# Patient Record
Sex: FEMALE | Race: WHITE | NOT HISPANIC OR LATINO | Employment: OTHER | URBAN - METROPOLITAN AREA
[De-identification: names, ages, dates, MRNs, and addresses within clinical notes are randomized per-mention and may not be internally consistent; named-entity substitution may affect disease eponyms.]

---

## 2023-04-20 ENCOUNTER — LAB (OUTPATIENT)
Dept: LAB | Facility: LAB | Age: 68
End: 2023-04-20
Payer: COMMERCIAL

## 2023-04-20 ENCOUNTER — OFFICE VISIT (OUTPATIENT)
Dept: PRIMARY CARE | Facility: CLINIC | Age: 68
End: 2023-04-20
Payer: COMMERCIAL

## 2023-04-20 VITALS
SYSTOLIC BLOOD PRESSURE: 122 MMHG | DIASTOLIC BLOOD PRESSURE: 78 MMHG | RESPIRATION RATE: 16 BRPM | HEIGHT: 63 IN | HEART RATE: 94 BPM | TEMPERATURE: 98 F | WEIGHT: 214.13 LBS | BODY MASS INDEX: 37.94 KG/M2 | OXYGEN SATURATION: 91 %

## 2023-04-20 DIAGNOSIS — J30.2 SEASONAL ALLERGIC RHINITIS, UNSPECIFIED TRIGGER: Primary | ICD-10-CM

## 2023-04-20 DIAGNOSIS — F41.9 ANXIETY: ICD-10-CM

## 2023-04-20 DIAGNOSIS — R91.1 PULMONARY NODULE: ICD-10-CM

## 2023-04-20 DIAGNOSIS — M25.471 EDEMA OF RIGHT ANKLE: ICD-10-CM

## 2023-04-20 DIAGNOSIS — R60.0 LOWER LEG EDEMA: ICD-10-CM

## 2023-04-20 DIAGNOSIS — G43.119 INTRACTABLE MIGRAINE WITH AURA WITHOUT STATUS MIGRAINOSUS: ICD-10-CM

## 2023-04-20 PROBLEM — G43.909 MIGRAINE: Status: ACTIVE | Noted: 2023-04-20

## 2023-04-20 PROBLEM — J30.9 ALLERGIC RHINITIS: Status: ACTIVE | Noted: 2023-04-20

## 2023-04-20 PROBLEM — J45.909 ASTHMA (HHS-HCC): Status: ACTIVE | Noted: 2023-04-20

## 2023-04-20 PROBLEM — E55.9 VITAMIN D DEFICIENCY: Status: ACTIVE | Noted: 2023-04-20

## 2023-04-20 PROBLEM — I10 HYPERTENSION: Status: ACTIVE | Noted: 2023-04-20

## 2023-04-20 PROBLEM — N28.9 ABNORMAL KIDNEY FUNCTION: Status: ACTIVE | Noted: 2023-04-20

## 2023-04-20 LAB
CREATININE (MG/DL) IN SER/PLAS: 1.08 MG/DL (ref 0.5–1.05)
GFR FEMALE: 56 ML/MIN/1.73M2

## 2023-04-20 PROCEDURE — 99204 OFFICE O/P NEW MOD 45 MIN: CPT

## 2023-04-20 PROCEDURE — 36415 COLL VENOUS BLD VENIPUNCTURE: CPT

## 2023-04-20 PROCEDURE — 1036F TOBACCO NON-USER: CPT

## 2023-04-20 PROCEDURE — 3078F DIAST BP <80 MM HG: CPT

## 2023-04-20 PROCEDURE — 1159F MED LIST DOCD IN RCRD: CPT

## 2023-04-20 PROCEDURE — 3074F SYST BP LT 130 MM HG: CPT

## 2023-04-20 PROCEDURE — 1160F RVW MEDS BY RX/DR IN RCRD: CPT

## 2023-04-20 PROCEDURE — 82565 ASSAY OF CREATININE: CPT

## 2023-04-20 RX ORDER — VERAPAMIL HYDROCHLORIDE 200 MG/1
200 CAPSULE, EXTENDED RELEASE ORAL DAILY
COMMUNITY
End: 2023-11-30 | Stop reason: SDUPTHER

## 2023-04-20 RX ORDER — SUMATRIPTAN 50 MG/1
50 TABLET, FILM COATED ORAL DAILY
COMMUNITY
Start: 2013-04-12 | End: 2023-04-20 | Stop reason: SDUPTHER

## 2023-04-20 RX ORDER — BUSPIRONE HYDROCHLORIDE 5 MG/1
10 TABLET ORAL 2 TIMES DAILY
Qty: 360 TABLET | Refills: 3 | Status: SHIPPED | OUTPATIENT
Start: 2023-04-20 | End: 2023-11-30 | Stop reason: SDUPTHER

## 2023-04-20 RX ORDER — TIZANIDINE HYDROCHLORIDE 4 MG/1
4 CAPSULE, GELATIN COATED ORAL 3 TIMES DAILY
COMMUNITY
Start: 2013-04-12

## 2023-04-20 RX ORDER — FLUTICASONE FUROATE AND VILANTEROL TRIFENATATE 100; 25 UG/1; UG/1
1 POWDER RESPIRATORY (INHALATION)
COMMUNITY
Start: 2017-04-07

## 2023-04-20 RX ORDER — ALBUTEROL SULFATE 90 UG/1
1 AEROSOL, METERED RESPIRATORY (INHALATION) EVERY 4 HOURS PRN
COMMUNITY
Start: 2017-02-06

## 2023-04-20 RX ORDER — TOLTERODINE 4 MG/1
4 CAPSULE, EXTENDED RELEASE ORAL DAILY
COMMUNITY
End: 2023-10-26 | Stop reason: SDUPTHER

## 2023-04-20 RX ORDER — CITALOPRAM 20 MG/1
20 TABLET, FILM COATED ORAL DAILY
COMMUNITY
End: 2023-10-26 | Stop reason: SDUPTHER

## 2023-04-20 RX ORDER — SUMATRIPTAN 50 MG/1
50 TABLET, FILM COATED ORAL CONTINUOUS PRN
Qty: 9 TABLET | Refills: 2 | Status: SHIPPED | OUTPATIENT
Start: 2023-04-20 | End: 2023-11-30 | Stop reason: SDUPTHER

## 2023-04-20 RX ORDER — ALENDRONATE SODIUM 70 MG/1
70 TABLET ORAL
COMMUNITY
End: 2023-10-26 | Stop reason: SDUPTHER

## 2023-04-20 RX ORDER — FLUTICASONE PROPIONATE 50 MCG
1 SPRAY, SUSPENSION (ML) NASAL DAILY
Qty: 15.8 ML | Refills: 3 | Status: SHIPPED | OUTPATIENT
Start: 2023-04-20 | End: 2023-11-30 | Stop reason: SDUPTHER

## 2023-04-20 RX ORDER — AMITRIPTYLINE HYDROCHLORIDE 50 MG/1
50 TABLET, FILM COATED ORAL NIGHTLY
COMMUNITY
End: 2023-10-26 | Stop reason: SDUPTHER

## 2023-04-20 RX ORDER — FLUTICASONE PROPIONATE 50 MCG
1 SPRAY, SUSPENSION (ML) NASAL DAILY
COMMUNITY
Start: 2017-06-30 | End: 2023-04-20 | Stop reason: SDUPTHER

## 2023-04-20 ASSESSMENT — COLUMBIA-SUICIDE SEVERITY RATING SCALE - C-SSRS
1. IN THE PAST MONTH, HAVE YOU WISHED YOU WERE DEAD OR WISHED YOU COULD GO TO SLEEP AND NOT WAKE UP?: NO
6. HAVE YOU EVER DONE ANYTHING, STARTED TO DO ANYTHING, OR PREPARED TO DO ANYTHING TO END YOUR LIFE?: NO
2. HAVE YOU ACTUALLY HAD ANY THOUGHTS OF KILLING YOURSELF?: NO

## 2023-04-20 ASSESSMENT — ENCOUNTER SYMPTOMS
SCALP TENDERNESS: 0
SINUS PRESSURE: 0
NUMBNESS: 0
ABDOMINAL PAIN: 1
HYPERVENTILATION: 0
VOMITING: 0
FATIGUE: 0
ORTHOPNEA: 0
PALPITATIONS: 0
BACK PAIN: 0
ABDOMINAL SWELLING: 0
EYE WATERING: 0
LOSS OF BALANCE: 0
DECREASED CONCENTRATION: 1
BLURRED VISION: 1
PND: 0
EYE REDNESS: 0
WEAKNESS: 0
SYNCOPE: 0
IRRITABILITY: 1
PANIC: 1
VISUAL CHANGE: 1
SWOLLEN GLANDS: 0
MIGRAINE HEADACHES: 0
COMPULSIONS: 0
FACIAL SWEATING: 0
FEVER: 0
TINGLING: 0
DEPRESSED MOOD: 0
COUGH: 0
SEIZURES: 0
ABNORMAL BEHAVIOR: 0
WEIGHT CHANGE: 0
CLUSTER HEADACHES: 0
SORE THROAT: 0
INSOMNIA: 0
HEMOPTYSIS: 0
ANOREXIA: 0
RHINORRHEA: 0
DIZZINESS: 1
NERVOUS/ANXIOUS: 1
WEIGHT LOSS: 0
NAUSEA: 0
PHOTOPHOBIA: 1
MUSCLE TENSION: 0
NECK PAIN: 0
RESTLESSNESS: 1
EYE PAIN: 0
SHORTNESS OF BREATH: 1
MALAISE: 0

## 2023-04-20 ASSESSMENT — PATIENT HEALTH QUESTIONNAIRE - PHQ9
SUM OF ALL RESPONSES TO PHQ9 QUESTIONS 1 AND 2: 0
2. FEELING DOWN, DEPRESSED OR HOPELESS: NOT AT ALL
1. LITTLE INTEREST OR PLEASURE IN DOING THINGS: NOT AT ALL

## 2023-04-20 NOTE — PROGRESS NOTES
Subjective   Patient ID: Amanda Toro is a 67 y.o. female who presents for Establish Care. And to address multiple issues.     Pulmonary nodules: pt was seen in ED for sudden onset of leg edema workup for this included a chest CT which found lung nodules follow-up PET confirmed presence and suggested 3 month follow-up CT, pt here to establish and get this ordered. I reviewed the results of both prior tests to confirm trail of events and need to proceed, order has been placed for new CT      Migraine   This is a chronic problem. The current episode started more than 1 year ago. The problem occurs intermittently. The problem has been waxing and waning. The pain is located in the Retro-orbital region. The pain does not radiate. The pain quality is similar to prior headaches. The quality of the pain is described as aching. The pain is severe. Associated symptoms include abdominal pain, blurred vision, dizziness, photophobia and a visual change. Pertinent negatives include no abnormal behavior, anorexia, back pain, coughing, drainage, ear pain, eye pain, eye redness, eye watering, facial sweating, fever, hearing loss, insomnia, loss of balance, muscle aches, nausea, neck pain, numbness, phonophobia, rhinorrhea, scalp tenderness, seizures, sinus pressure, sore throat, swollen glands, tingling, tinnitus, vomiting, weakness or weight loss. Nothing aggravates the symptoms. She has tried triptans for the symptoms. The treatment provided significant relief. Her past medical history is significant for hypertension, migraines in the family and obesity. There is no history of cancer, cluster headaches, immunosuppression, migraine headaches, pseudotumor cerebri, recent head traumas, sinus disease or TMJ.   Edema    Presents with chronic edema. The current episode started more than 1 month ago (was seen in ED and by prior provider was advised to follow-up with cardiology has not done so). The onset of the episode was sudden.  These episodes happen in the evening. The problem presents itself daily. The problem has been gradually worsening. The edema is present on the both side(s). Risk factors for edema include no known risk factors.   Associated agents include no associated agents.   Associated symptoms include abdominal pain and nocturia.   Pertinent negative symptoms include no abdominal swelling, no chest pain, no cough, no fatigue, no fever, no hemoptysis, no nausea, no orthopnea, no palpitations, no PND, no presyncope, no syncope, no vomiting and no weight change.     Pertinent negative history includes no CAD. Treatments tried include elevating limb(s). There has been mild improvement on treatment(s).   Anxiety  Presents for initial visit. Onset was 1 to 5 years ago. The problem has been waxing and waning. Symptoms include decreased concentration, dizziness, excessive worry, irritability, nervous/anxious behavior, panic, restlessness and shortness of breath. Patient reports no chest pain, compulsions, depressed mood, hyperventilation, insomnia, malaise, muscle tension, nausea, palpitations or suicidal ideas. Symptoms occur most days. The severity of symptoms is moderate. The symptoms are aggravated by family issues. The patient sleeps 3 hours per night. The quality of sleep is poor. Nighttime awakenings: several.     Risk factors include family history. Her past medical history is significant for asthma. There is no history of anemia, anxiety/panic attacks, arrhythmia, bipolar disorder, CAD, CHF, chronic lung disease, depression, fibromyalgia, hyperthyroidism or suicide attempts. Past treatments include benzodiazephines. The treatment provided mild relief. Compliance with prior treatments has been variable.        Review of Systems   Constitutional:  Positive for irritability. Negative for fatigue, fever and weight loss.   HENT:  Negative for ear pain, hearing loss, rhinorrhea, sinus pressure, sore throat and tinnitus.    Eyes:   "Positive for blurred vision and photophobia. Negative for pain and redness.   Respiratory:  Positive for shortness of breath. Negative for cough and hemoptysis.    Cardiovascular:  Negative for chest pain, palpitations, syncope and PND.   Gastrointestinal:  Positive for abdominal pain. Negative for anorexia, nausea and vomiting.   Genitourinary:  Positive for nocturia.   Musculoskeletal:  Negative for back pain and neck pain.   Neurological:  Positive for dizziness. Negative for tingling, seizures, weakness, numbness and loss of balance.   Psychiatric/Behavioral:  Positive for decreased concentration. Negative for suicidal ideas. The patient is nervous/anxious. The patient does not have insomnia.        Objective   /78   Pulse 94   Temp 36.7 °C (98 °F)   Resp 16   Ht 1.6 m (5' 3\")   Wt 97.1 kg (214 lb 2 oz)   SpO2 91%   BMI 37.93 kg/m²     Physical Exam    Assessment/Plan   Problem List Items Addressed This Visit          Musculoskeletal    Lower leg edema    Relevant Orders    Referral to Cardiology    Edema of right ankle    Relevant Orders    Referral to Cardiology       Other    Allergic rhinitis - Primary    Relevant Medications    fluticasone (Flonase) 50 mcg/actuation nasal spray    Migraine    Relevant Medications    SUMAtriptan (Imitrex) 50 mg tablet     Other Visit Diagnoses       Pulmonary nodule        Relevant Orders    CT chest w IV contrast    Creatinine, Serum               "

## 2023-04-20 NOTE — PATIENT INSTRUCTIONS
Assessment/Plan   Problem List Items Addressed This Visit          Musculoskeletal    Lower leg edema    Relevant Orders    Referral to Cardiology    Edema of right ankle    Relevant Orders    Referral to Cardiology       Other    Allergic rhinitis - Primary    Relevant Medications    fluticasone (Flonase) 50 mcg/actuation nasal spray    Migraine    Relevant Medications    SUMAtriptan (Imitrex) 50 mg tablet     Other Visit Diagnoses       Pulmonary nodule        Relevant Orders    CT chest w IV contrast    Creatinine, Serum

## 2023-04-25 ENCOUNTER — APPOINTMENT (OUTPATIENT)
Dept: PRIMARY CARE | Facility: CLINIC | Age: 68
End: 2023-04-25
Payer: COMMERCIAL

## 2023-04-26 ENCOUNTER — TELEPHONE (OUTPATIENT)
Dept: PRIMARY CARE | Facility: CLINIC | Age: 68
End: 2023-04-26
Payer: COMMERCIAL

## 2023-04-26 DIAGNOSIS — R91.1 LUNG NODULE SEEN ON IMAGING STUDY: ICD-10-CM

## 2023-04-26 DIAGNOSIS — R91.8 LUNG NODULE, MULTIPLE: Primary | ICD-10-CM

## 2023-05-04 ENCOUNTER — TELEPHONE (OUTPATIENT)
Dept: PRIMARY CARE | Facility: CLINIC | Age: 68
End: 2023-05-04
Payer: COMMERCIAL

## 2023-05-04 NOTE — TELEPHONE ENCOUNTER
Amanda calling because her CT was cancelled for tomorrow and she was told to call you to see what you wanted to do since insurance didn't approve.

## 2023-05-04 NOTE — TELEPHONE ENCOUNTER
Left patient a [facility], placed order for her to be seen by the pulmonary nodule clinic. As my attempt to get her repeat CT has been denied.

## 2023-05-04 NOTE — TELEPHONE ENCOUNTER
CT was denied by insurance for follow-up of PET that had been follow-up of CT were several noncalcified left lung nodules measuring up to 9mm were found. Pt will now be referred to the lung nodule clinic to attempt to get further workup approved.

## 2023-10-26 ENCOUNTER — PATIENT MESSAGE (OUTPATIENT)
Dept: PRIMARY CARE | Facility: CLINIC | Age: 68
End: 2023-10-26
Payer: COMMERCIAL

## 2023-10-26 DIAGNOSIS — M81.0 AGE RELATED OSTEOPOROSIS, UNSPECIFIED PATHOLOGICAL FRACTURE PRESENCE: ICD-10-CM

## 2023-10-26 DIAGNOSIS — F41.9 ANXIETY: ICD-10-CM

## 2023-10-26 DIAGNOSIS — I10 PRIMARY HYPERTENSION: Primary | ICD-10-CM

## 2023-10-26 DIAGNOSIS — R32 URINARY INCONTINENCE, UNSPECIFIED TYPE: ICD-10-CM

## 2023-10-26 RX ORDER — CITALOPRAM 20 MG/1
20 TABLET, FILM COATED ORAL DAILY
Qty: 90 TABLET | Refills: 3 | Status: SHIPPED | OUTPATIENT
Start: 2023-10-26 | End: 2023-11-30 | Stop reason: SDUPTHER

## 2023-10-26 RX ORDER — ALENDRONATE SODIUM 70 MG/1
70 TABLET ORAL
Qty: 4 TABLET | Refills: 11 | Status: SHIPPED | OUTPATIENT
Start: 2023-10-26 | End: 2023-11-30 | Stop reason: SDUPTHER

## 2023-10-26 RX ORDER — TOLTERODINE 4 MG/1
4 CAPSULE, EXTENDED RELEASE ORAL DAILY
Qty: 90 CAPSULE | Refills: 3 | Status: SHIPPED | OUTPATIENT
Start: 2023-10-26 | End: 2024-10-25

## 2023-10-26 RX ORDER — AMITRIPTYLINE HYDROCHLORIDE 50 MG/1
50 TABLET, FILM COATED ORAL NIGHTLY
Qty: 90 TABLET | Refills: 3 | Status: SHIPPED | OUTPATIENT
Start: 2023-10-26 | End: 2023-11-30 | Stop reason: SDUPTHER

## 2023-11-22 ENCOUNTER — APPOINTMENT (OUTPATIENT)
Dept: PRIMARY CARE | Facility: CLINIC | Age: 68
End: 2023-11-22
Payer: COMMERCIAL

## 2023-11-30 ENCOUNTER — OFFICE VISIT (OUTPATIENT)
Dept: PRIMARY CARE | Facility: CLINIC | Age: 68
End: 2023-11-30
Payer: COMMERCIAL

## 2023-11-30 VITALS
WEIGHT: 220 LBS | BODY MASS INDEX: 38.98 KG/M2 | SYSTOLIC BLOOD PRESSURE: 134 MMHG | HEART RATE: 95 BPM | DIASTOLIC BLOOD PRESSURE: 84 MMHG | OXYGEN SATURATION: 95 % | HEIGHT: 63 IN

## 2023-11-30 DIAGNOSIS — M81.0 AGE RELATED OSTEOPOROSIS, UNSPECIFIED PATHOLOGICAL FRACTURE PRESENCE: ICD-10-CM

## 2023-11-30 DIAGNOSIS — F51.01 PRIMARY INSOMNIA: ICD-10-CM

## 2023-11-30 DIAGNOSIS — G43.119 INTRACTABLE MIGRAINE WITH AURA WITHOUT STATUS MIGRAINOSUS: ICD-10-CM

## 2023-11-30 DIAGNOSIS — F41.9 ANXIETY: ICD-10-CM

## 2023-11-30 DIAGNOSIS — L30.9 DERMATITIS: Primary | ICD-10-CM

## 2023-11-30 DIAGNOSIS — I10 PRIMARY HYPERTENSION: ICD-10-CM

## 2023-11-30 DIAGNOSIS — Z12.31 ENCOUNTER FOR SCREENING MAMMOGRAM FOR MALIGNANT NEOPLASM OF BREAST: ICD-10-CM

## 2023-11-30 DIAGNOSIS — R60.0 LOWER LEG EDEMA: ICD-10-CM

## 2023-11-30 DIAGNOSIS — J30.2 SEASONAL ALLERGIC RHINITIS, UNSPECIFIED TRIGGER: ICD-10-CM

## 2023-11-30 PROCEDURE — 3075F SYST BP GE 130 - 139MM HG: CPT

## 2023-11-30 PROCEDURE — 3079F DIAST BP 80-89 MM HG: CPT

## 2023-11-30 PROCEDURE — 1159F MED LIST DOCD IN RCRD: CPT

## 2023-11-30 PROCEDURE — 1036F TOBACCO NON-USER: CPT

## 2023-11-30 PROCEDURE — 1160F RVW MEDS BY RX/DR IN RCRD: CPT

## 2023-11-30 PROCEDURE — 99214 OFFICE O/P EST MOD 30 MIN: CPT

## 2023-11-30 RX ORDER — BUSPIRONE HYDROCHLORIDE 5 MG/1
10 TABLET ORAL 2 TIMES DAILY
Qty: 360 TABLET | Refills: 3 | Status: SHIPPED | OUTPATIENT
Start: 2023-11-30 | End: 2024-11-29

## 2023-11-30 RX ORDER — TRAZODONE HYDROCHLORIDE 50 MG/1
50 TABLET ORAL NIGHTLY PRN
Qty: 30 TABLET | Refills: 0 | Status: SHIPPED | OUTPATIENT
Start: 2023-11-30 | End: 2024-01-25

## 2023-11-30 RX ORDER — AMITRIPTYLINE HYDROCHLORIDE 50 MG/1
50 TABLET, FILM COATED ORAL NIGHTLY
Qty: 90 TABLET | Refills: 3 | Status: SHIPPED | OUTPATIENT
Start: 2023-11-30 | End: 2024-11-29

## 2023-11-30 RX ORDER — CHLORTHALIDONE 25 MG/1
25 TABLET ORAL DAILY
Qty: 30 TABLET | Refills: 0 | Status: SHIPPED | OUTPATIENT
Start: 2023-11-30 | End: 2024-01-25

## 2023-11-30 RX ORDER — SUMATRIPTAN 50 MG/1
50 TABLET, FILM COATED ORAL CONTINUOUS PRN
Qty: 9 TABLET | Refills: 2 | Status: SHIPPED | OUTPATIENT
Start: 2023-11-30

## 2023-11-30 RX ORDER — VERAPAMIL HYDROCHLORIDE 200 MG/1
200 CAPSULE, EXTENDED RELEASE ORAL DAILY
Qty: 90 CAPSULE | Refills: 3 | Status: SHIPPED | OUTPATIENT
Start: 2023-11-30 | End: 2024-11-29

## 2023-11-30 RX ORDER — FLUTICASONE PROPIONATE 50 MCG
1 SPRAY, SUSPENSION (ML) NASAL DAILY
Qty: 15.8 ML | Refills: 3 | Status: SHIPPED | OUTPATIENT
Start: 2023-11-30

## 2023-11-30 RX ORDER — MUPIROCIN 20 MG/G
OINTMENT TOPICAL
Qty: 30 G | Refills: 0 | Status: SHIPPED | OUTPATIENT
Start: 2023-11-30 | End: 2023-12-10

## 2023-11-30 RX ORDER — ALENDRONATE SODIUM 70 MG/1
70 TABLET ORAL
Qty: 4 TABLET | Refills: 11 | Status: SHIPPED | OUTPATIENT
Start: 2023-11-30 | End: 2024-11-29

## 2023-11-30 RX ORDER — CITALOPRAM 20 MG/1
20 TABLET, FILM COATED ORAL DAILY
Qty: 90 TABLET | Refills: 3 | Status: SHIPPED | OUTPATIENT
Start: 2023-11-30 | End: 2024-11-29

## 2023-11-30 ASSESSMENT — ENCOUNTER SYMPTOMS
VOMITING: 1
HYPERTENSION: 1
INSOMNIA: 1
SHORTNESS OF BREATH: 0
ANOREXIA: 0
BACK PAIN: 0
PHOTOPHOBIA: 0
NECK PAIN: 0
MIGRAINE HEADACHES: 1
RHINORRHEA: 0
TINGLING: 0
HEADACHES: 0
VISUAL CHANGE: 1
DIZZINESS: 1
SORE THROAT: 0
EYE PAIN: 1
WEAKNESS: 1
LOSS OF BALANCE: 1
ABNORMAL BEHAVIOR: 0
SWOLLEN GLANDS: 0
NUMBNESS: 0
EYE WATERING: 1
PALPITATIONS: 0

## 2023-11-30 ASSESSMENT — PATIENT HEALTH QUESTIONNAIRE - PHQ9
SUM OF ALL RESPONSES TO PHQ9 QUESTIONS 1 AND 2: 0
1. LITTLE INTEREST OR PLEASURE IN DOING THINGS: NOT AT ALL
2. FEELING DOWN, DEPRESSED OR HOPELESS: NOT AT ALL

## 2023-11-30 NOTE — PROGRESS NOTES
Subjective   Patient ID: Amanda Toro is a 68 y.o. female who presents for Follow-up (Med refill).    Hypertension  This is a chronic problem. The current episode started more than 1 year ago. The problem has been waxing and waning since onset. The problem is uncontrolled. Associated symptoms include anxiety and peripheral edema. Pertinent negatives include no chest pain, headaches, neck pain, palpitations or shortness of breath. There are no associated agents to hypertension. Risk factors for coronary artery disease include obesity and post-menopausal state. Past treatments include calcium channel blockers. The current treatment provides significant improvement.   Migraine   This is a chronic problem. The current episode started more than 1 year ago. The problem occurs intermittently (2-6 per month). The problem has been unchanged. The pain is located in the Retro-orbital region. The pain does not radiate. The pain quality is similar to prior headaches. Quality: severe vertigo. Associated symptoms include dizziness, eye pain, eye watering, insomnia, a loss of balance, a visual change, vomiting and weakness. Pertinent negatives include no abnormal behavior, anorexia, back pain, neck pain, numbness, phonophobia, photophobia, rhinorrhea, sore throat, swollen glands, tingling or tinnitus. Nothing aggravates the symptoms. She has tried triptans for the symptoms. The treatment provided significant relief. Her past medical history is significant for migraine headaches.   Anxiety  Presents for follow-up visit. Symptoms include dizziness and insomnia. Patient reports no chest pain, palpitations or shortness of breath. Symptoms occur most days. The severity of symptoms is causing significant distress. The patient sleeps 4 hours per night. The quality of sleep is non-restorative. Nighttime awakenings: several.       Insomnia  This is a chronic problem. The current episode started more than 1 year ago. The problem occurs  "daily. The problem has been unchanged. Associated symptoms include a visual change, vomiting and weakness. Pertinent negatives include no anorexia, chest pain, headaches, neck pain, numbness, sore throat or swollen glands. Nothing aggravates the symptoms. Treatments tried: otc sleep aid. The treatment provided no relief.        Review of Systems   HENT:  Negative for rhinorrhea, sore throat and tinnitus.    Eyes:  Positive for pain. Negative for photophobia.   Respiratory:  Negative for shortness of breath.    Cardiovascular:  Negative for chest pain and palpitations.   Gastrointestinal:  Positive for vomiting. Negative for anorexia.   Musculoskeletal:  Negative for back pain and neck pain.   Neurological:  Positive for dizziness, weakness and loss of balance. Negative for tingling, numbness and headaches.   Psychiatric/Behavioral:  The patient has insomnia.        Objective   BP (!) 140/114   Pulse 95   Ht 1.6 m (5' 3\")   Wt 99.8 kg (220 lb)   SpO2 95%   BMI 38.97 kg/m²     Physical Exam  Vitals and nursing note reviewed.   Constitutional:       General: She is not in acute distress.     Appearance: Normal appearance. She is obese. She is not ill-appearing or toxic-appearing.   Eyes:      Extraocular Movements: Extraocular movements intact.      Pupils: Pupils are equal, round, and reactive to light.   Cardiovascular:      Pulses: Normal pulses.      Heart sounds: Normal heart sounds.   Pulmonary:      Effort: Pulmonary effort is normal.      Breath sounds: Normal breath sounds.   Abdominal:      General: Abdomen is flat. Bowel sounds are normal.   Neurological:      Mental Status: She is alert.         Assessment/Plan   Problem List Items Addressed This Visit             ICD-10-CM    Allergic rhinitis J30.9    Relevant Medications    fluticasone (Flonase) 50 mcg/actuation nasal spray    Anxiety F41.9    Relevant Medications    citalopram (CeleXA) 20 mg tablet    busPIRone (Buspar) 5 mg tablet    amitriptyline " (Elavil) 50 mg tablet    Hypertension I10    Relevant Medications    verapamil ER (Veralan PM) 200 mg 24 hr capsule    chlorthalidone (Hygroton) 25 mg tablet    Lower leg edema R60.0    Migraine G43.909    Relevant Medications    SUMAtriptan (Imitrex) 50 mg tablet     Other Visit Diagnoses         Codes    Dermatitis    -  Primary L30.9    Relevant Medications    mupirocin (Bactroban) 2 % ointment    Age related osteoporosis, unspecified pathological fracture presence     M81.0    Relevant Medications    alendronate (Fosamax) 70 mg tablet          Followup for welcome to medicare in January 2024, will do pnm vaccine at this visit

## 2023-12-27 ENCOUNTER — HOSPITAL ENCOUNTER (OUTPATIENT)
Dept: RADIOLOGY | Facility: HOSPITAL | Age: 68
Discharge: HOME | End: 2023-12-27
Payer: COMMERCIAL

## 2023-12-27 DIAGNOSIS — Z12.31 ENCOUNTER FOR SCREENING MAMMOGRAM FOR MALIGNANT NEOPLASM OF BREAST: ICD-10-CM

## 2023-12-27 PROCEDURE — 77067 SCR MAMMO BI INCL CAD: CPT

## 2024-01-16 ENCOUNTER — APPOINTMENT (OUTPATIENT)
Dept: PRIMARY CARE | Facility: CLINIC | Age: 69
End: 2024-01-16
Payer: COMMERCIAL

## 2024-02-27 ENCOUNTER — PATIENT MESSAGE (OUTPATIENT)
Dept: PRIMARY CARE | Facility: CLINIC | Age: 69
End: 2024-02-27
Payer: COMMERCIAL

## 2024-02-27 DIAGNOSIS — I10 PRIMARY HYPERTENSION: ICD-10-CM

## 2024-02-27 DIAGNOSIS — F51.01 PRIMARY INSOMNIA: ICD-10-CM

## 2024-02-28 RX ORDER — TRAZODONE HYDROCHLORIDE 50 MG/1
50 TABLET ORAL NIGHTLY PRN
Qty: 30 TABLET | Refills: 3 | Status: SHIPPED | OUTPATIENT
Start: 2024-02-28

## 2024-02-28 RX ORDER — CHLORTHALIDONE 25 MG/1
25 TABLET ORAL DAILY
Qty: 30 TABLET | Refills: 3 | Status: SHIPPED | OUTPATIENT
Start: 2024-02-28

## 2024-03-13 ENCOUNTER — OFFICE VISIT (OUTPATIENT)
Dept: PRIMARY CARE | Facility: CLINIC | Age: 69
End: 2024-03-13
Payer: COMMERCIAL

## 2024-03-13 ENCOUNTER — LAB (OUTPATIENT)
Dept: LAB | Facility: LAB | Age: 69
End: 2024-03-13
Payer: COMMERCIAL

## 2024-03-13 VITALS
DIASTOLIC BLOOD PRESSURE: 76 MMHG | OXYGEN SATURATION: 94 % | SYSTOLIC BLOOD PRESSURE: 127 MMHG | HEART RATE: 90 BPM | BODY MASS INDEX: 39.16 KG/M2 | HEIGHT: 63 IN | WEIGHT: 221 LBS

## 2024-03-13 DIAGNOSIS — E66.01 OBESITY, MORBID (MULTI): ICD-10-CM

## 2024-03-13 DIAGNOSIS — D69.6 LOW PLATELET COUNT (CMS-HCC): ICD-10-CM

## 2024-03-13 DIAGNOSIS — Z00.00 ROUTINE GENERAL MEDICAL EXAMINATION AT HEALTH CARE FACILITY: ICD-10-CM

## 2024-03-13 DIAGNOSIS — E55.9 VITAMIN D DEFICIENCY: ICD-10-CM

## 2024-03-13 DIAGNOSIS — N18.31 STAGE 3A CHRONIC KIDNEY DISEASE (MULTI): Primary | ICD-10-CM

## 2024-03-13 DIAGNOSIS — R91.8 LUNG NODULE, MULTIPLE: ICD-10-CM

## 2024-03-13 DIAGNOSIS — Z13.6 SCREENING FOR CARDIOVASCULAR CONDITION: ICD-10-CM

## 2024-03-13 DIAGNOSIS — Z23 NEED FOR VACCINATION: ICD-10-CM

## 2024-03-13 DIAGNOSIS — Z13.6 SCREENING FOR HEART DISEASE: ICD-10-CM

## 2024-03-13 DIAGNOSIS — Z00.00 ROUTINE GENERAL MEDICAL EXAMINATION AT HEALTH CARE FACILITY: Primary | ICD-10-CM

## 2024-03-13 LAB
ALBUMIN SERPL BCP-MCNC: 3.9 G/DL (ref 3.4–5)
ALP SERPL-CCNC: 65 U/L (ref 33–136)
ALT SERPL W P-5'-P-CCNC: 22 U/L (ref 7–45)
ANION GAP SERPL CALC-SCNC: 19 MMOL/L (ref 10–20)
AST SERPL W P-5'-P-CCNC: 32 U/L (ref 9–39)
BILIRUB SERPL-MCNC: 0.4 MG/DL (ref 0–1.2)
BUN SERPL-MCNC: 21 MG/DL (ref 6–23)
CALCIUM SERPL-MCNC: 9.1 MG/DL (ref 8.6–10.3)
CHLORIDE SERPL-SCNC: 99 MMOL/L (ref 98–107)
CHOLEST SERPL-MCNC: 222 MG/DL (ref 0–199)
CHOLESTEROL/HDL RATIO: 2.1
CO2 SERPL-SCNC: 23 MMOL/L (ref 21–32)
CREAT SERPL-MCNC: 1.28 MG/DL (ref 0.5–1.05)
EGFRCR SERPLBLD CKD-EPI 2021: 45 ML/MIN/1.73M*2
ERYTHROCYTE [DISTWIDTH] IN BLOOD BY AUTOMATED COUNT: 12.3 % (ref 11.5–14.5)
GLUCOSE SERPL-MCNC: 93 MG/DL (ref 74–99)
HCT VFR BLD AUTO: 43.3 % (ref 36–46)
HDLC SERPL-MCNC: 105.3 MG/DL
HGB BLD-MCNC: 13.7 G/DL (ref 12–16)
LDLC SERPL CALC-MCNC: 100 MG/DL
MCH RBC QN AUTO: 30.2 PG (ref 26–34)
MCHC RBC AUTO-ENTMCNC: 31.6 G/DL (ref 32–36)
MCV RBC AUTO: 96 FL (ref 80–100)
NON HDL CHOLESTEROL: 117 MG/DL (ref 0–149)
NRBC BLD-RTO: 0 /100 WBCS (ref 0–0)
PLATELET # BLD AUTO: 187 X10*3/UL (ref 150–450)
POTASSIUM SERPL-SCNC: 3.6 MMOL/L (ref 3.5–5.3)
PROT SERPL-MCNC: 7 G/DL (ref 6.4–8.2)
RBC # BLD AUTO: 4.53 X10*6/UL (ref 4–5.2)
SODIUM SERPL-SCNC: 137 MMOL/L (ref 136–145)
TRIGL SERPL-MCNC: 84 MG/DL (ref 0–149)
TSH SERPL-ACNC: 2.11 MIU/L (ref 0.44–3.98)
VLDL: 17 MG/DL (ref 0–40)
WBC # BLD AUTO: 7.1 X10*3/UL (ref 4.4–11.3)

## 2024-03-13 PROCEDURE — 36415 COLL VENOUS BLD VENIPUNCTURE: CPT

## 2024-03-13 PROCEDURE — 84443 ASSAY THYROID STIM HORMONE: CPT

## 2024-03-13 PROCEDURE — 85027 COMPLETE CBC AUTOMATED: CPT

## 2024-03-13 PROCEDURE — 86803 HEPATITIS C AB TEST: CPT

## 2024-03-13 PROCEDURE — 3074F SYST BP LT 130 MM HG: CPT

## 2024-03-13 PROCEDURE — 82306 VITAMIN D 25 HYDROXY: CPT

## 2024-03-13 PROCEDURE — 99213 OFFICE O/P EST LOW 20 MIN: CPT

## 2024-03-13 PROCEDURE — 80053 COMPREHEN METABOLIC PANEL: CPT

## 2024-03-13 PROCEDURE — 80061 LIPID PANEL: CPT

## 2024-03-13 PROCEDURE — 90677 PCV20 VACCINE IM: CPT

## 2024-03-13 PROCEDURE — 1159F MED LIST DOCD IN RCRD: CPT

## 2024-03-13 PROCEDURE — 3078F DIAST BP <80 MM HG: CPT

## 2024-03-13 PROCEDURE — G0009 ADMIN PNEUMOCOCCAL VACCINE: HCPCS

## 2024-03-13 PROCEDURE — 1158F ADVNC CARE PLAN TLK DOCD: CPT

## 2024-03-13 PROCEDURE — 1036F TOBACCO NON-USER: CPT

## 2024-03-13 PROCEDURE — 1123F ACP DISCUSS/DSCN MKR DOCD: CPT

## 2024-03-13 PROCEDURE — G0402 INITIAL PREVENTIVE EXAM: HCPCS

## 2024-03-13 PROCEDURE — 1170F FXNL STATUS ASSESSED: CPT

## 2024-03-13 ASSESSMENT — VISUAL ACUITY
OD_CC: 20/30 -1
OS_CC: 20/30 -1

## 2024-03-13 ASSESSMENT — ENCOUNTER SYMPTOMS
FEVER: 0
RHINORRHEA: 0
HEMATURIA: 0
FACIAL ASYMMETRY: 0
SHORTNESS OF BREATH: 0
CONSTIPATION: 0
COUGH: 0
LIGHT-HEADEDNESS: 0
SINUS PRESSURE: 0
FATIGUE: 0
SORE THROAT: 0
WOUND: 0
BACK PAIN: 0
DYSURIA: 0
ABDOMINAL PAIN: 0
SEIZURES: 0
WHEEZING: 0
SINUS PAIN: 0
ARTHRALGIAS: 0
PSYCHIATRIC NEGATIVE: 1
NAUSEA: 0
UNEXPECTED WEIGHT CHANGE: 0
JOINT SWELLING: 0
PALPITATIONS: 0
TROUBLE SWALLOWING: 0
WEAKNESS: 0

## 2024-03-13 ASSESSMENT — ACTIVITIES OF DAILY LIVING (ADL)
USING TRANSPORTATION: INDEPENDENT
PILL BOX USED: YES
TAKING_MEDICATION: INDEPENDENT
DOING_HOUSEWORK: INDEPENDENT
JUDGMENT_ADEQUATE_SAFELY_COMPLETE_DAILY_ACTIVITIES: YES
TOILETING: INDEPENDENT
FEEDING: INDEPENDENT
STIL DRIVING: YES
MANAGING_FINANCES: INDEPENDENT
DRESSING: INDEPENDENT
TAKING MEDICATION: INDEPENDENT
MANAGING FINANCES: INDEPENDENT
PREPARING MEALS: INDEPENDENT
EATING: INDEPENDENT
NEEDS ASSISTANCE WITH FOOD: INDEPENDENT
BATHING: INDEPENDENT
GROCERY_SHOPPING: INDEPENDENT
DOING HOUSEWORK: INDEPENDENT
USING TELEPHONE: INDEPENDENT
ADEQUATE_TO_COMPLETE_ADL: YES
DRESSING: INDEPENDENT
GROCERY SHOPPING: INDEPENDENT
BATHING: INDEPENDENT

## 2024-03-13 ASSESSMENT — PATIENT HEALTH QUESTIONNAIRE - PHQ9
SUM OF ALL RESPONSES TO PHQ9 QUESTIONS 1 AND 2: 0
SUM OF ALL RESPONSES TO PHQ9 QUESTIONS 1 AND 2: 0
1. LITTLE INTEREST OR PLEASURE IN DOING THINGS: NOT AT ALL
2. FEELING DOWN, DEPRESSED OR HOPELESS: NOT AT ALL
1. LITTLE INTEREST OR PLEASURE IN DOING THINGS: NOT AT ALL
2. FEELING DOWN, DEPRESSED OR HOPELESS: NOT AT ALL

## 2024-03-13 NOTE — PROGRESS NOTES
Subjective   Reason for Visit: Amanda Toro is an 68 y.o. female here for a Medicare Wellness visit.          Reviewed all medications by prescribing practitioner or clinical pharmacist (such as prescriptions, OTCs, herbal therapies and supplements) and documented in the medical record.    Pt is here for annual wellness.  Reports overall health is good, reviewed depression screening    Do you take any herbs or supplements that were not prescribed by a doctor? Yes mag for migarne, vit c, vit d3  Are you taking calcium supplements? yes  Are you taking aspirin daily? 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 yes    1. Patient Counseling:  --Nutrition: Stressed importance of moderation in sodium/caffeine intake, saturated fat and cholesterol, caloric balance, sufficient intake of fresh fruits, vegetables, fiber, calcium, iron, and 1 mg of folate supplement per day (for females capable of pregnancy).  --Discussed the issue of estrogen replacement, calcium supplement, and the daily use of baby aspirin.  --Exercise: Stressed the importance of regular exercise.   --Substance Abuse: Discussed cessation/primary prevention of tobacco, alcohol, or other drug use; driving or other dangerous activities under the influence; availability of treatment for abuse.    --Sexuality: Discussed sexually transmitted diseases, partner selection, use of condoms, avoidance of unintended pregnancy  and contraceptive alternatives.   --Injury prevention: Discussed safety belts, safety helmets, smoke detector, smoking near bedding or upholstery.   --Dental health: Discussed importance of regular tooth brushing, flossing, and dental visits.  --Immunizations reviewed.  --Discussed  "benefits of screening colonoscopy.  --After hours service discussed with patient  2 Discussed the patient's BMI with her.  The BMI is above average. The patient received Provided instructions on dietary changes In a face to face session, I informed patient of his/her BMI > 30. We discussed appropriate nutrition choices and exercise plan to help achieve weight reduction.   Provided instructions on exercise because they have an above normal BMI.  3 Follow up as needed for acute illness          Patient Care Team:  MARI Daltno as PCP - General (Family Medicine)  MARI Patel as PCP - [facility] PCP     Review of Systems   Constitutional:  Negative for fatigue, fever and unexpected weight change.   HENT:  Negative for congestion, ear discharge, ear pain, nosebleeds, postnasal drip, rhinorrhea, sinus pressure, sinus pain, sneezing, sore throat and trouble swallowing.    Respiratory:  Negative for cough, shortness of breath and wheezing.    Cardiovascular:  Negative for chest pain, palpitations and leg swelling.   Gastrointestinal:  Negative for abdominal pain, constipation and nausea.   Genitourinary:  Negative for dysuria, hematuria, menstrual problem, pelvic pain, vaginal bleeding and vaginal pain.   Musculoskeletal:  Negative for arthralgias, back pain, gait problem and joint swelling.   Skin:  Negative for rash and wound.   Neurological:  Negative for seizures, facial asymmetry, weakness and light-headedness.   Psychiatric/Behavioral: Negative.         Objective   Vitals:  /76   Pulse 90   Ht 1.6 m (5' 3\")   Wt 100 kg (221 lb)   SpO2 94%   BMI 39.15 kg/m²       Physical Exam  Constitutional:       Appearance: Normal appearance.   HENT:      Head: Normocephalic and atraumatic.      Right Ear: Tympanic membrane and external ear normal.      Left Ear: Tympanic membrane and external ear normal.      Nose: Nose normal.      Mouth/Throat:      Mouth: Mucous membranes are moist.      " Pharynx: Oropharynx is clear. Uvula midline.   Eyes:      General: Lids are normal.      Extraocular Movements: Extraocular movements intact.      Pupils: Pupils are equal, round, and reactive to light.   Neck:      Thyroid: No thyromegaly or thyroid tenderness.   Cardiovascular:      Rate and Rhythm: Normal rate and regular rhythm.      Heart sounds: Normal heart sounds.   Pulmonary:      Effort: Pulmonary effort is normal.      Breath sounds: Normal breath sounds.   Abdominal:      General: Bowel sounds are normal.      Palpations: Abdomen is soft.      Tenderness: There is no abdominal tenderness. There is no guarding.   Musculoskeletal:         General: No swelling. Normal range of motion.      Cervical back: Normal range of motion.      Right lower leg: No edema.      Left lower leg: No edema.   Lymphadenopathy:      Head:      Right side of head: No submental, submandibular or tonsillar adenopathy.      Left side of head: No submental, submandibular or tonsillar adenopathy.      Cervical: No cervical adenopathy.   Skin:     General: Skin is warm and dry.      Capillary Refill: Capillary refill takes less than 2 seconds.      Coloration: Skin is not jaundiced.      Findings: No lesion or rash.   Neurological:      General: No focal deficit present.      Mental Status: She is alert and oriented to person, place, and time.   Psychiatric:         Mood and Affect: Mood normal.         Behavior: Behavior normal.         Thought Content: Thought content normal.         Judgment: Judgment normal.         Assessment/Plan   Problem List Items Addressed This Visit    None    Amanda was seen today for welcome to medicare.  Diagnoses and all orders for this visit:  Routine general medical examination at health care facility  -     Comprehensive Metabolic Panel; Future  -     Hepatitis C Antibody; Future  -     TSH with reflex to Free T4 if abnormal; Future  Screening for cardiovascular condition  -     Cancel: ECG 12  lead  Need for vaccination  -     Cancel: Pneumococcal polysaccharide vaccine 23-valent greater than or equal to 2 years old subcutaneous/IM  -     Pneumococcal conjugate vaccine, 20-valent (PREVNAR 20)  Lung nodule, multiple  Comments:  ct scan last may will get repeat done this may  Orders:  -     CT chest wo IV contrast; Future  Vitamin D deficiency  -     Vitamin D 25-Hydroxy,Total (for eval of Vitamin D levels); Future  Low platelet count (CMS/HCC)  Comments:  was stable on last cbc will repeat today  Orders:  -     CBC; Future  Obesity, morbid (CMS/HCC)  Comments:  discussed need to increase activity and watch calories in  Screening for heart disease  -     Lipid Panel; Future

## 2024-03-13 NOTE — PATIENT INSTRUCTIONS
#BMI 39    Aim for 60 gm of Protein daily  Drink 60 oz of Water daily  Exercise 60 min at least 5days a week  There are many options for weight loss:  *not all programs work for all people, the best way to success is to be 100% ready for change and commitment to 1-2 programs at a time.    Look again into [employer] (the promo code on the website is NOOM20) for cognitive behavior therapy.  [employer] is an option for a meal replacement program.   TripsByTips is a calorie counting kely that is Free and is successful if used properly.   There are several medications also being used (which all also require dietary changes to work):  phentermine, contrave, Wegovy, Saxenda, etc.   Please check with your insurance provider for coverage of such medications.    - eat off the smaller plate (like the salad plate)  - half the plate should be vegetables, 1/4 protein, 1/4 carbs - for lunch and dinner  -  discussed dietary changes including proper protein intake, increase vegetable intake, fruit intake, low carbohydrate intake, and no processed food intake.  - discussed with patient to stop eating fast food  -  put your fork down between bites  - drink at least 64 oz of water day.  do not consume large amounts of water with your meal  - do not drink sugary drinks such as pop or specialty coffee drinks  -  eat your vegetables and protein first.  Have vegetables at lunch and dinner  - discussed with patient to increase activity 4 days a week preferably 5. Exercise should be done 5 days a week including walking for 20-30 minutes each day.   -  keep log of calorie intake and food intake and bring with you to next appointment.You can use an kely on your phone such as [employer]-Data pal.  - discussed with patient using activity trackers such as a fit bit. log  activity daily and bring  activity log at next visit  -  follow-up in one month  - increase your protein intake - should have at least 4 servings of protein per day  - decrease carb intake -  discussed carb serving size - limit carb servings to 4 servings a day

## 2024-03-14 LAB
25(OH)D3 SERPL-MCNC: 45 NG/ML (ref 30–100)
HCV AB SER QL: NONREACTIVE

## 2024-04-02 ENCOUNTER — HOSPITAL ENCOUNTER (OUTPATIENT)
Dept: RADIOLOGY | Facility: EXTERNAL LOCATION | Age: 69
Discharge: HOME | End: 2024-04-02

## 2024-04-02 DIAGNOSIS — S29.9XXA RIB INJURY: ICD-10-CM

## 2024-04-03 ENCOUNTER — HOSPITAL ENCOUNTER (OUTPATIENT)
Dept: RADIOLOGY | Facility: HOSPITAL | Age: 69
Discharge: HOME | End: 2024-04-03
Payer: COMMERCIAL

## 2024-04-03 DIAGNOSIS — R91.8 LUNG NODULE, MULTIPLE: ICD-10-CM

## 2024-04-03 PROCEDURE — 71250 CT THORAX DX C-: CPT | Performed by: RADIOLOGY

## 2024-04-03 PROCEDURE — 71250 CT THORAX DX C-: CPT

## 2024-04-09 ENCOUNTER — PATIENT MESSAGE (OUTPATIENT)
Dept: PRIMARY CARE | Facility: CLINIC | Age: 69
End: 2024-04-09
Payer: COMMERCIAL

## 2024-04-09 DIAGNOSIS — I10 PRIMARY HYPERTENSION: Primary | ICD-10-CM

## 2024-04-11 RX ORDER — DILTIAZEM HYDROCHLORIDE 120 MG/1
120 CAPSULE, COATED, EXTENDED RELEASE ORAL DAILY
Qty: 30 CAPSULE | Refills: 5 | Status: SHIPPED | OUTPATIENT
Start: 2024-04-11 | End: 2024-10-08

## 2024-05-09 ENCOUNTER — LAB (OUTPATIENT)
Dept: LAB | Facility: LAB | Age: 69
End: 2024-05-09
Payer: COMMERCIAL

## 2024-05-09 DIAGNOSIS — N18.31 STAGE 3A CHRONIC KIDNEY DISEASE (MULTI): ICD-10-CM

## 2024-05-09 PROCEDURE — 36415 COLL VENOUS BLD VENIPUNCTURE: CPT

## 2024-05-09 PROCEDURE — 80069 RENAL FUNCTION PANEL: CPT

## 2024-05-10 LAB
ALBUMIN SERPL BCP-MCNC: 3.9 G/DL (ref 3.4–5)
ANION GAP SERPL CALC-SCNC: 16 MMOL/L (ref 10–20)
BUN SERPL-MCNC: 20 MG/DL (ref 6–23)
CALCIUM SERPL-MCNC: 9.2 MG/DL (ref 8.6–10.3)
CHLORIDE SERPL-SCNC: 101 MMOL/L (ref 98–107)
CO2 SERPL-SCNC: 24 MMOL/L (ref 21–32)
CREAT SERPL-MCNC: 1.26 MG/DL (ref 0.5–1.05)
EGFRCR SERPLBLD CKD-EPI 2021: 46 ML/MIN/1.73M*2
GLUCOSE SERPL-MCNC: 100 MG/DL (ref 74–99)
PHOSPHATE SERPL-MCNC: 2.4 MG/DL (ref 2.5–4.9)
POTASSIUM SERPL-SCNC: 3.2 MMOL/L (ref 3.5–5.3)
SODIUM SERPL-SCNC: 138 MMOL/L (ref 136–145)

## 2024-07-15 ENCOUNTER — PATIENT MESSAGE (OUTPATIENT)
Dept: PRIMARY CARE | Facility: CLINIC | Age: 69
End: 2024-07-15
Payer: COMMERCIAL

## 2024-07-15 DIAGNOSIS — Z98.818 OTHER DENTAL PROCEDURE STATUS: Primary | ICD-10-CM

## 2024-07-16 RX ORDER — CEPHALEXIN 500 MG/1
2000 CAPSULE ORAL ONCE
Qty: 4 CAPSULE | Refills: 0 | Status: SHIPPED | OUTPATIENT
Start: 2024-07-16 | End: 2024-07-16

## 2024-09-11 ENCOUNTER — APPOINTMENT (OUTPATIENT)
Dept: PRIMARY CARE | Facility: CLINIC | Age: 69
End: 2024-09-11
Payer: COMMERCIAL

## 2024-09-11 VITALS
HEART RATE: 99 BPM | OXYGEN SATURATION: 94 % | BODY MASS INDEX: 37.39 KG/M2 | HEIGHT: 63 IN | SYSTOLIC BLOOD PRESSURE: 125 MMHG | WEIGHT: 211 LBS | DIASTOLIC BLOOD PRESSURE: 79 MMHG

## 2024-09-11 DIAGNOSIS — F41.9 ANXIETY: ICD-10-CM

## 2024-09-11 DIAGNOSIS — Z23 NEED FOR VACCINATION: ICD-10-CM

## 2024-09-11 DIAGNOSIS — J45.20 MILD INTERMITTENT ASTHMA WITHOUT COMPLICATION (HHS-HCC): Primary | ICD-10-CM

## 2024-09-11 DIAGNOSIS — G43.119 INTRACTABLE MIGRAINE WITH AURA WITHOUT STATUS MIGRAINOSUS: ICD-10-CM

## 2024-09-11 DIAGNOSIS — J30.2 SEASONAL ALLERGIC RHINITIS, UNSPECIFIED TRIGGER: ICD-10-CM

## 2024-09-11 DIAGNOSIS — I10 PRIMARY HYPERTENSION: ICD-10-CM

## 2024-09-11 DIAGNOSIS — M81.0 AGE RELATED OSTEOPOROSIS, UNSPECIFIED PATHOLOGICAL FRACTURE PRESENCE: ICD-10-CM

## 2024-09-11 DIAGNOSIS — R32 URINARY INCONTINENCE, UNSPECIFIED TYPE: ICD-10-CM

## 2024-09-11 DIAGNOSIS — F51.01 PRIMARY INSOMNIA: ICD-10-CM

## 2024-09-11 DIAGNOSIS — B37.9 CANDIDIASIS: ICD-10-CM

## 2024-09-11 PROCEDURE — 90662 IIV NO PRSV INCREASED AG IM: CPT

## 2024-09-11 PROCEDURE — G0008 ADMIN INFLUENZA VIRUS VAC: HCPCS

## 2024-09-11 PROCEDURE — 3074F SYST BP LT 130 MM HG: CPT

## 2024-09-11 PROCEDURE — 1123F ACP DISCUSS/DSCN MKR DOCD: CPT

## 2024-09-11 PROCEDURE — 1036F TOBACCO NON-USER: CPT

## 2024-09-11 PROCEDURE — 99215 OFFICE O/P EST HI 40 MIN: CPT

## 2024-09-11 PROCEDURE — 3008F BODY MASS INDEX DOCD: CPT

## 2024-09-11 PROCEDURE — 1159F MED LIST DOCD IN RCRD: CPT

## 2024-09-11 PROCEDURE — 3078F DIAST BP <80 MM HG: CPT

## 2024-09-11 RX ORDER — BUSPIRONE HYDROCHLORIDE 5 MG/1
10 TABLET ORAL 2 TIMES DAILY
Qty: 360 TABLET | Refills: 3 | Status: SHIPPED | OUTPATIENT
Start: 2024-09-11 | End: 2025-09-11

## 2024-09-11 RX ORDER — CHLORTHALIDONE 25 MG/1
25 TABLET ORAL DAILY
Qty: 90 TABLET | Refills: 3 | Status: SHIPPED | OUTPATIENT
Start: 2024-09-11 | End: 2025-09-11

## 2024-09-11 RX ORDER — ALBUTEROL SULFATE 90 UG/1
1 INHALANT RESPIRATORY (INHALATION) EVERY 4 HOURS PRN
Qty: 18 G | Refills: 1 | Status: SHIPPED | OUTPATIENT
Start: 2024-09-11

## 2024-09-11 RX ORDER — TOLTERODINE 4 MG/1
4 CAPSULE, EXTENDED RELEASE ORAL DAILY
Qty: 90 CAPSULE | Refills: 3 | Status: SHIPPED | OUTPATIENT
Start: 2024-09-11 | End: 2025-09-11

## 2024-09-11 RX ORDER — TRAZODONE HYDROCHLORIDE 50 MG/1
50 TABLET ORAL NIGHTLY PRN
Qty: 30 TABLET | Refills: 11 | Status: SHIPPED | OUTPATIENT
Start: 2024-09-11 | End: 2025-09-11

## 2024-09-11 RX ORDER — SUMATRIPTAN 50 MG/1
50 TABLET, FILM COATED ORAL CONTINUOUS PRN
Qty: 9 TABLET | Refills: 2 | Status: SHIPPED | OUTPATIENT
Start: 2024-09-11

## 2024-09-11 RX ORDER — NYSTATIN 100000 U/G
CREAM TOPICAL 2 TIMES DAILY
Qty: 30 G | Refills: 2 | Status: SHIPPED | OUTPATIENT
Start: 2024-09-11 | End: 2024-09-18

## 2024-09-11 RX ORDER — ALENDRONATE SODIUM 70 MG/1
70 TABLET ORAL
Qty: 4 TABLET | Refills: 11 | Status: SHIPPED | OUTPATIENT
Start: 2024-09-11 | End: 2025-09-11

## 2024-09-11 RX ORDER — CITALOPRAM 20 MG/1
20 TABLET, FILM COATED ORAL DAILY
Qty: 90 TABLET | Refills: 3 | Status: SHIPPED | OUTPATIENT
Start: 2024-09-11 | End: 2025-09-11

## 2024-09-11 RX ORDER — DILTIAZEM HYDROCHLORIDE 120 MG/1
120 CAPSULE, COATED, EXTENDED RELEASE ORAL DAILY
Qty: 90 CAPSULE | Refills: 3 | Status: SHIPPED | OUTPATIENT
Start: 2024-09-11 | End: 2025-09-11

## 2024-09-11 RX ORDER — FLUTICASONE PROPIONATE 50 MCG
1 SPRAY, SUSPENSION (ML) NASAL DAILY
Qty: 15.8 ML | Refills: 3 | Status: SHIPPED | OUTPATIENT
Start: 2024-09-11

## 2024-09-11 RX ORDER — AMITRIPTYLINE HYDROCHLORIDE 50 MG/1
50 TABLET, FILM COATED ORAL NIGHTLY
Qty: 90 TABLET | Refills: 3 | Status: SHIPPED | OUTPATIENT
Start: 2024-09-11 | End: 2025-09-11

## 2024-09-11 ASSESSMENT — ENCOUNTER SYMPTOMS
WEAKNESS: 0
FEVER: 0
VOMITING: 0
SHORTNESS OF BREATH: 0
SEIZURES: 0
NERVOUS/ANXIOUS: 1
PALPITATIONS: 0
FACIAL SWEATING: 1
SCALP TENDERNESS: 0
BACK PAIN: 0
DECREASED CONCENTRATION: 0
BLURRED VISION: 1
MIGRAINE HEADACHES: 1
INSOMNIA: 1
DEPRESSED MOOD: 0
DIZZINESS: 1
HYPERTENSION: 1
HEADACHES: 0
PHOTOPHOBIA: 1
IRRITABILITY: 1
FATIGUE: 1
WHEEZING: 1
WEIGHT LOSS: 0

## 2024-09-11 NOTE — PROGRESS NOTES
Subjective   Patient ID: Amanda Toro is a 69 y.o. female who presents for Follow-up (6 month FUV; NEEDS PHYSICAL (MAW DONE ALREADY)/ Concerns with constipation ).    Asthma  She complains of wheezing. There is no shortness of breath. This is a chronic problem. The current episode started more than 1 year ago. The problem occurs intermittently. The problem has been unchanged. Pertinent negatives include no fever, headaches, malaise/fatigue or weight loss. Her symptoms are aggravated by URI and strenuous activity. Her symptoms are alleviated by beta-agonist. She reports significant (using inhaler no more a couple times per month) improvement on treatment. Her past medical history is significant for asthma.   Migraine   This is a chronic problem. The current episode started more than 1 year ago. The problem occurs intermittently. The problem has been unchanged. The pain is located in the Retro-orbital region. The pain does not radiate. The pain quality is similar to prior headaches. The quality of the pain is described as shooting. The pain is severe. Associated symptoms include blurred vision, dizziness, facial sweating, insomnia and photophobia. Pertinent negatives include no back pain, fever, hearing loss, scalp tenderness, seizures, vomiting, weakness or weight loss. The symptoms are aggravated by unknown. She has tried triptans for the symptoms. The treatment provided significant relief. Her past medical history is significant for migraine headaches.   Hypertension  This is a chronic problem. The current episode started more than 1 year ago. The problem is unchanged. The problem is controlled. Associated symptoms include anxiety and blurred vision. Pertinent negatives include no headaches, malaise/fatigue, palpitations, peripheral edema or shortness of breath. Risk factors for coronary artery disease include obesity, post-menopausal state, stress, sedentary lifestyle and dyslipidemia. Past treatments include  "calcium channel blockers and diuretics. The current treatment provides significant improvement.   Insomnia  This is a chronic problem. The current episode started more than 1 year ago. The problem occurs daily. Associated symptoms include fatigue and a rash. Pertinent negatives include no fever, headaches, vomiting or weakness. Nothing aggravates the symptoms. Treatments tried: well controlled with trazadone.   Anxiety  Presents for follow-up visit. Symptoms include dizziness, insomnia, irritability and nervous/anxious behavior. Patient reports no decreased concentration, depressed mood, palpitations or shortness of breath. Primary symptoms comment: well controlled with current medication will send refills.     Her past medical history is significant for asthma.   Rash  This is a chronic problem. The current episode started more than 1 year ago. The problem has been waxing and waning since onset. Location: under bilateral breast and in abdominal fold. Associated symptoms include fatigue. Pertinent negatives include no fever, shortness of breath or vomiting. Past treatments include antibiotic cream. The treatment provided moderate relief. Her past medical history is significant for asthma.        Review of Systems   Constitutional:  Positive for fatigue and irritability. Negative for fever, malaise/fatigue and weight loss.   HENT:  Negative for hearing loss.    Eyes:  Positive for blurred vision and photophobia.   Respiratory:  Positive for wheezing. Negative for shortness of breath.    Cardiovascular:  Negative for palpitations.   Gastrointestinal:  Negative for vomiting.   Musculoskeletal:  Negative for back pain.   Skin:  Positive for rash.   Neurological:  Positive for dizziness. Negative for seizures, weakness and headaches.   Psychiatric/Behavioral:  Negative for decreased concentration. The patient is nervous/anxious and has insomnia.        Objective   /79   Pulse 99   Ht 1.6 m (5' 3\")   Wt 95.7 kg " (211 lb)   SpO2 94%   BMI 37.38 kg/m²     Physical Exam  Vitals reviewed.   Constitutional:       General: She is not in acute distress.     Appearance: Normal appearance. She is obese. She is not ill-appearing.   Cardiovascular:      Heart sounds: Normal heart sounds.   Pulmonary:      Breath sounds: Normal breath sounds.   Neurological:      Mental Status: She is alert and oriented to person, place, and time.         Assessment/Plan   Amanda was seen today for follow-up.  Diagnoses and all orders for this visit:  Mild intermittent asthma without complication ([facility])  -     albuterol (Ventolin HFA) 90 mcg/actuation inhaler; Inhale 1 puff every 4 hours if needed for wheezing.  Urinary incontinence, unspecified type  -     tolterodine LA (Detrol LA) 4 mg 24 hr capsule; Take 1 capsule (4 mg) by mouth once daily.  Primary insomnia  -     traZODone (Desyrel) 50 mg tablet; Take 1 tablet (50 mg) by mouth as needed at bedtime for sleep.  Primary hypertension  -     chlorthalidone (Hygroton) 25 mg tablet; Take 1 tablet (25 mg) by mouth once daily.  -     dilTIAZem  mg 24 hr capsule; Take 1 capsule (120 mg) by mouth once daily.  Intractable migraine with aura without status migrainosus  -     SUMAtriptan (Imitrex) 50 mg tablet; Take 1 tablet (50 mg) by mouth continuously if needed for migraine (take no more than 9 per month).  Seasonal allergic rhinitis, unspecified trigger  -     fluticasone (Flonase) 50 mcg/actuation nasal spray; Administer 1 spray into each nostril once daily.  Age related osteoporosis, unspecified pathological fracture presence  -     alendronate (Fosamax) 70 mg tablet; Take 1 tablet (70 mg) by mouth every 7 days.  Candidiasis  -     nystatin (Mycostatin) cream; Apply topically 2 times a day for 7 days. apply to affected area  Anxiety  -     busPIRone (Buspar) 5 mg tablet; Take 2 tablets (10 mg) by mouth 2 times a day.  -     amitriptyline (Elavil) 50 mg tablet; Take 1 tablet (50 mg) by mouth  once daily at bedtime.  -     citalopram (CeleXA) 20 mg tablet; Take 1 tablet (20 mg) by mouth once daily.  Need for vaccination  -     Flu vaccine, trivalent, preservative free, HIGH-DOSE, age 65y+ (Fluzone)

## 2025-03-12 ENCOUNTER — APPOINTMENT (OUTPATIENT)
Dept: PRIMARY CARE | Facility: CLINIC | Age: 70
End: 2025-03-12
Payer: COMMERCIAL

## 2025-03-12 VITALS
HEART RATE: 90 BPM | DIASTOLIC BLOOD PRESSURE: 81 MMHG | OXYGEN SATURATION: 97 % | SYSTOLIC BLOOD PRESSURE: 125 MMHG | WEIGHT: 209 LBS | HEIGHT: 63 IN | BODY MASS INDEX: 37.03 KG/M2

## 2025-03-12 DIAGNOSIS — E55.9 VITAMIN D DEFICIENCY: ICD-10-CM

## 2025-03-12 DIAGNOSIS — Z00.00 ROUTINE GENERAL MEDICAL EXAMINATION AT HEALTH CARE FACILITY: Primary | ICD-10-CM

## 2025-03-12 DIAGNOSIS — Z13.220 SCREENING FOR HYPERLIPIDEMIA: ICD-10-CM

## 2025-03-12 DIAGNOSIS — Z12.31 ENCOUNTER FOR SCREENING MAMMOGRAM FOR BREAST CANCER: ICD-10-CM

## 2025-03-12 DIAGNOSIS — R32 URINARY INCONTINENCE, UNSPECIFIED TYPE: ICD-10-CM

## 2025-03-12 DIAGNOSIS — Z00.00 WELLNESS EXAMINATION: ICD-10-CM

## 2025-03-12 PROCEDURE — 99397 PER PM REEVAL EST PAT 65+ YR: CPT

## 2025-03-12 PROCEDURE — 1123F ACP DISCUSS/DSCN MKR DOCD: CPT

## 2025-03-12 PROCEDURE — 1036F TOBACCO NON-USER: CPT

## 2025-03-12 PROCEDURE — 1170F FXNL STATUS ASSESSED: CPT

## 2025-03-12 PROCEDURE — 3008F BODY MASS INDEX DOCD: CPT

## 2025-03-12 PROCEDURE — 1159F MED LIST DOCD IN RCRD: CPT

## 2025-03-12 PROCEDURE — 3074F SYST BP LT 130 MM HG: CPT

## 2025-03-12 PROCEDURE — 3079F DIAST BP 80-89 MM HG: CPT

## 2025-03-12 PROCEDURE — 1160F RVW MEDS BY RX/DR IN RCRD: CPT

## 2025-03-12 PROCEDURE — G0439 PPPS, SUBSEQ VISIT: HCPCS

## 2025-03-12 RX ORDER — TOLTERODINE 4 MG/1
4 CAPSULE, EXTENDED RELEASE ORAL DAILY
Qty: 90 CAPSULE | Refills: 3 | Status: SHIPPED | OUTPATIENT
Start: 2025-03-12 | End: 2026-03-12

## 2025-03-12 ASSESSMENT — ENCOUNTER SYMPTOMS
COUGH: 0
FATIGUE: 0
WOUND: 0
DYSURIA: 0
ABDOMINAL PAIN: 0
SORE THROAT: 0
SHORTNESS OF BREATH: 0
CONSTIPATION: 0
FEVER: 0
PSYCHIATRIC NEGATIVE: 1
RHINORRHEA: 0
BACK PAIN: 0
SEIZURES: 0
WEAKNESS: 0
HEMATURIA: 0
TROUBLE SWALLOWING: 0
ARTHRALGIAS: 0
FACIAL ASYMMETRY: 0
UNEXPECTED WEIGHT CHANGE: 0
LIGHT-HEADEDNESS: 0
JOINT SWELLING: 0
PALPITATIONS: 0
WHEEZING: 0
NAUSEA: 0
HEADACHES: 1
SINUS PRESSURE: 0
SINUS PAIN: 0

## 2025-03-12 ASSESSMENT — ACTIVITIES OF DAILY LIVING (ADL)
MANAGING_FINANCES: INDEPENDENT
DOING_HOUSEWORK: INDEPENDENT
TAKING_MEDICATION: INDEPENDENT
GROCERY_SHOPPING: INDEPENDENT
DRESSING: INDEPENDENT
BATHING: INDEPENDENT

## 2025-03-13 LAB
25(OH)D3+25(OH)D2 SERPL-MCNC: 38 NG/ML (ref 30–100)
ALBUMIN SERPL-MCNC: 4 G/DL (ref 3.6–5.1)
ALP SERPL-CCNC: 73 U/L (ref 37–153)
ALT SERPL-CCNC: 17 U/L (ref 6–29)
ANION GAP SERPL CALCULATED.4IONS-SCNC: 12 MMOL/L (CALC) (ref 7–17)
AST SERPL-CCNC: 52 U/L (ref 10–35)
BILIRUB SERPL-MCNC: 0.3 MG/DL (ref 0.2–1.2)
BUN SERPL-MCNC: 14 MG/DL (ref 7–25)
CALCIUM SERPL-MCNC: 9.7 MG/DL (ref 8.6–10.4)
CHLORIDE SERPL-SCNC: 101 MMOL/L (ref 98–110)
CHOLEST SERPL-MCNC: 228 MG/DL
CHOLEST/HDLC SERPL: 2.3 (CALC)
CO2 SERPL-SCNC: 28 MMOL/L (ref 20–32)
CREAT SERPL-MCNC: 1.12 MG/DL (ref 0.6–1)
EGFRCR SERPLBLD CKD-EPI 2021: 53 ML/MIN/1.73M2
GLUCOSE SERPL-MCNC: 104 MG/DL (ref 65–99)
HDLC SERPL-MCNC: 101 MG/DL
LDLC SERPL CALC-MCNC: 105 MG/DL (CALC)
NONHDLC SERPL-MCNC: 127 MG/DL (CALC)
POTASSIUM SERPL-SCNC: 3.9 MMOL/L (ref 3.5–5.3)
PROT SERPL-MCNC: 6.8 G/DL (ref 6.1–8.1)
SODIUM SERPL-SCNC: 141 MMOL/L (ref 135–146)
TRIGL SERPL-MCNC: 128 MG/DL

## 2025-04-01 ENCOUNTER — HOSPITAL ENCOUNTER (OUTPATIENT)
Dept: RADIOLOGY | Facility: HOSPITAL | Age: 70
Discharge: HOME | End: 2025-04-01
Payer: COMMERCIAL

## 2025-04-01 DIAGNOSIS — Z12.31 ENCOUNTER FOR SCREENING MAMMOGRAM FOR BREAST CANCER: ICD-10-CM

## 2025-04-01 PROCEDURE — 77067 SCR MAMMO BI INCL CAD: CPT

## 2025-04-01 PROCEDURE — 77063 BREAST TOMOSYNTHESIS BI: CPT | Performed by: STUDENT IN AN ORGANIZED HEALTH CARE EDUCATION/TRAINING PROGRAM

## 2025-04-01 PROCEDURE — 77067 SCR MAMMO BI INCL CAD: CPT | Performed by: STUDENT IN AN ORGANIZED HEALTH CARE EDUCATION/TRAINING PROGRAM

## 2025-04-15 ENCOUNTER — TELEPHONE (OUTPATIENT)
Dept: PRIMARY CARE | Facility: CLINIC | Age: 70
End: 2025-04-15
Payer: COMMERCIAL

## 2025-04-28 DIAGNOSIS — J30.2 SEASONAL ALLERGIC RHINITIS, UNSPECIFIED TRIGGER: ICD-10-CM

## 2025-04-28 RX ORDER — FLUTICASONE PROPIONATE 50 MCG
1 SPRAY, SUSPENSION (ML) NASAL DAILY
Qty: 16 G | Refills: 0 | Status: SHIPPED | OUTPATIENT
Start: 2025-04-28

## 2025-09-17 ENCOUNTER — APPOINTMENT (OUTPATIENT)
Dept: PRIMARY CARE | Facility: CLINIC | Age: 70
End: 2025-09-17
Payer: COMMERCIAL